# Patient Record
Sex: MALE | Race: WHITE | NOT HISPANIC OR LATINO | Employment: OTHER | URBAN - METROPOLITAN AREA
[De-identification: names, ages, dates, MRNs, and addresses within clinical notes are randomized per-mention and may not be internally consistent; named-entity substitution may affect disease eponyms.]

---

## 2024-02-07 ENCOUNTER — HOSPITAL ENCOUNTER (EMERGENCY)
Facility: HOSPITAL | Age: 54
Discharge: HOME | End: 2024-02-07
Attending: STUDENT IN AN ORGANIZED HEALTH CARE EDUCATION/TRAINING PROGRAM

## 2024-02-07 VITALS
OXYGEN SATURATION: 98 % | WEIGHT: 155 LBS | DIASTOLIC BLOOD PRESSURE: 77 MMHG | SYSTOLIC BLOOD PRESSURE: 112 MMHG | TEMPERATURE: 98 F | HEIGHT: 70 IN | BODY MASS INDEX: 22.19 KG/M2 | HEART RATE: 60 BPM | RESPIRATION RATE: 16 BRPM

## 2024-02-07 DIAGNOSIS — M54.16 LUMBAR RADICULAR PAIN: ICD-10-CM

## 2024-02-07 DIAGNOSIS — M54.31 SCIATICA OF RIGHT SIDE: Primary | ICD-10-CM

## 2024-02-07 DIAGNOSIS — M51.26 PROTRUSION OF LUMBAR INTERVERTEBRAL DISC: ICD-10-CM

## 2024-02-07 PROCEDURE — 72131 CT LUMBAR SPINE W/O DYE: CPT | Mod: 26,,, | Performed by: RADIOLOGY

## 2024-02-07 PROCEDURE — 73502 X-RAY EXAM HIP UNI 2-3 VIEWS: CPT | Mod: 26,RT,, | Performed by: RADIOLOGY

## 2024-02-07 PROCEDURE — 25000003 PHARM REV CODE 250: Performed by: NURSE PRACTITIONER

## 2024-02-07 PROCEDURE — 96372 THER/PROPH/DIAG INJ SC/IM: CPT | Performed by: NURSE PRACTITIONER

## 2024-02-07 PROCEDURE — 99285 EMERGENCY DEPT VISIT HI MDM: CPT | Mod: 25

## 2024-02-07 PROCEDURE — 73502 X-RAY EXAM HIP UNI 2-3 VIEWS: CPT | Mod: TC,RT

## 2024-02-07 PROCEDURE — 72131 CT LUMBAR SPINE W/O DYE: CPT | Mod: TC

## 2024-02-07 PROCEDURE — 63600175 PHARM REV CODE 636 W HCPCS: Performed by: NURSE PRACTITIONER

## 2024-02-07 RX ORDER — HYDROMORPHONE HYDROCHLORIDE 1 MG/ML
1 INJECTION, SOLUTION INTRAMUSCULAR; INTRAVENOUS; SUBCUTANEOUS
Status: COMPLETED | OUTPATIENT
Start: 2024-02-07 | End: 2024-02-07

## 2024-02-07 RX ORDER — TRAMADOL HYDROCHLORIDE 50 MG/1
50 TABLET ORAL
Status: COMPLETED | OUTPATIENT
Start: 2024-02-07 | End: 2024-02-07

## 2024-02-07 RX ORDER — ORPHENADRINE CITRATE 30 MG/ML
60 INJECTION INTRAMUSCULAR; INTRAVENOUS
Status: COMPLETED | OUTPATIENT
Start: 2024-02-07 | End: 2024-02-07

## 2024-02-07 RX ORDER — OXYCODONE AND ACETAMINOPHEN 5; 325 MG/1; MG/1
1 TABLET ORAL EVERY 6 HOURS PRN
Qty: 12 TABLET | Refills: 0 | Status: SHIPPED | OUTPATIENT
Start: 2024-02-07

## 2024-02-07 RX ORDER — DEXAMETHASONE SODIUM PHOSPHATE 10 MG/ML
10 INJECTION INTRAMUSCULAR; INTRAVENOUS
Status: COMPLETED | OUTPATIENT
Start: 2024-02-07 | End: 2024-02-07

## 2024-02-07 RX ORDER — KETOROLAC TROMETHAMINE 30 MG/ML
60 INJECTION, SOLUTION INTRAMUSCULAR; INTRAVENOUS
Status: COMPLETED | OUTPATIENT
Start: 2024-02-07 | End: 2024-02-07

## 2024-02-07 RX ORDER — PREDNISONE 50 MG/1
50 TABLET ORAL DAILY
Qty: 5 TABLET | Refills: 0 | Status: SHIPPED | OUTPATIENT
Start: 2024-02-07

## 2024-02-07 RX ADMIN — KETOROLAC TROMETHAMINE 60 MG: 30 INJECTION INTRAMUSCULAR; INTRAVENOUS at 06:02

## 2024-02-07 RX ADMIN — TRAMADOL HYDROCHLORIDE 50 MG: 50 TABLET, COATED ORAL at 07:02

## 2024-02-07 RX ADMIN — ORPHENADRINE CITRATE 60 MG: 60 INJECTION INTRAMUSCULAR; INTRAVENOUS at 06:02

## 2024-02-07 RX ADMIN — DEXAMETHASONE SODIUM PHOSPHATE 10 MG: 10 INJECTION INTRAMUSCULAR; INTRAVENOUS at 10:02

## 2024-02-07 RX ADMIN — HYDROMORPHONE HYDROCHLORIDE 1 MG: 1 INJECTION, SOLUTION INTRAMUSCULAR; INTRAVENOUS; SUBCUTANEOUS at 10:02

## 2024-02-07 NOTE — ED TRIAGE NOTES
Pt arrives via [facility] c/o low back pain radiating down R leg after lifting heavy wheelbarrow 2 weeks ago .

## 2024-02-08 NOTE — DISCHARGE INSTRUCTIONS
Take the medications as prescribed. Return for any worsening or new symptoms. Follow up with spine specialist for further evaluation/MRI

## 2024-02-08 NOTE — ED PROVIDER NOTES
"CHIEF COMPLAINT  Chief Complaint   Patient presents with    Back Pain     Pt arrives via [facility] c/o low back pain radiating down R leg after lifting heavy wheelbarrow 1 week ago.       HISTORY OF PRESENT ILLNESS  Raji Story is a 53 y.o. male pmh of liver problem from ETOH abuse presenting with right lower back pain, radiating to right upper/lower leg for the past 1 week. Patient states he felt pain right awary after he lifted heavy wheelbarrow 1 week ago, has not tried any medication, trying to stand up or move around with severe pain, radiating to right lower leg. Patient was ambulatory with pain. No other specific aggravating or relieving factors otherwise.      PAST MEDICAL HISTORY  History reviewed. No pertinent past medical history.    CURRENT MEDICATIONS    No current facility-administered medications for this encounter.    Current Outpatient Medications:     oxyCODONE-acetaminophen (PERCOCET) 5-325 mg per tablet, Take 1 tablet by mouth every 6 (six) hours as needed for Pain., Disp: 12 tablet, Rfl: 0    predniSONE (DELTASONE) 50 MG Tab, Take 1 tablet (50 mg total) by mouth once daily., Disp: 5 tablet, Rfl: 0    ALLERGIES    Review of patient's allergies indicates:  No Known Allergies    SURGICAL HISTORY    History reviewed. No pertinent surgical history.    SOCIAL HISTORY    Social History     Socioeconomic History    Marital status: Single   Tobacco Use    Smoking status: Some Days     Types: Cigars    Smokeless tobacco: Never   Substance and Sexual Activity    Alcohol use: Not Currently       FAMILY HISTORY    History reviewed. No pertinent family history.    REVIEW OF SYSTEMS    Review of Systems   Musculoskeletal:  Positive for back pain and falls.     All other systems reviewed and are negative    VITAL SIGNS:   /77 (BP Location: Right arm, Patient Position: Sitting)   Pulse 60   Temp 98.2 °F (36.8 °C) (Oral)   Resp 16   Ht 5' 10" (1.778 m)   Wt 70.3 kg (155 lb)   SpO2 98%   BMI " 22.24 kg/m²      Physical Exam  Constitutional:       Appearance: Normal appearance.   HENT:      Head: Normocephalic.   Cardiovascular:      Rate and Rhythm: Normal rate.   Pulmonary:      Effort: Pulmonary effort is normal. No respiratory distress.      Breath sounds: Normal breath sounds.   Abdominal:      Palpations: Abdomen is soft.   Musculoskeletal:      Thoracic back: Normal range of motion.      Lumbar back: Spasms and tenderness present. Decreased range of motion. Positive right straight leg raise test.        Back:       Right hip: Tenderness present. No lacerations or crepitus. Decreased range of motion.      Left hip: No deformity.      Right upper leg: Tenderness present.        Legs:    Skin:     General: Skin is warm.      Capillary Refill: Capillary refill takes less than 2 seconds.   Neurological:      General: No focal deficit present.      Mental Status: He is alert.      GCS: GCS eye subscore is 4. GCS verbal subscore is 5. GCS motor subscore is 6.   Psychiatric:         Attention and Perception: Attention normal.         Mood and Affect: Mood normal.         Speech: Speech normal.       Vitals and nursing note reviewed.     LABS    Labs Reviewed - No data to display      EKG    No results found for this or any previous visit.      RADIOLOGY    CT Lumbar Spine Without Contrast   Final Result      No CT evidence of acute fracture or traumatic subluxation of the lumbar spine.      Broad-based disc bulge with suspected right paracentral disc protrusion at L5-S1.  This results in narrowing of the right lateral recess and may affect the descending right S1 nerve root, noting evaluation is limited by CT technique.  Clinical correlation advised in this patient with reported right lower extremity pain.  Follow-up MRI could be performed when clinically appropriate.      Additional degenerative changes of the lumbar spine, noting multilevel left-sided facet arthropathy.  Please see above for level by  level details.      Stent noted 0.5 cm pulmonary nodule at the right lung base.  Per Fleischner Society guidelines for nodule <6mm; in a low risk patient, no follow-up recommended.  In a high risk patient/smoker, consider 12 month CT chest follow-up to exclude neoplasia. If stable at that time, no further follow-up needed.         Electronically signed by: Jan Goodman MD   Date:    02/07/2024   Time:    21:31      X-Ray Hip 2 or 3 views Right (with Pelvis when performed)   Final Result      No radiographic evidence of acute displaced fracture or dislocation.         Electronically signed by: Jan Goodman MD   Date:    02/07/2024   Time:    19:36            PROCEDURES    Procedures    Medications   ketorolac injection 60 mg (60 mg Intramuscular Given 2/7/24 1850)   orphenadrine injection 60 mg (60 mg Intramuscular Given 2/7/24 1850)   traMADoL tablet 50 mg (50 mg Oral Given 2/7/24 1955)   HYDROmorphone injection 1 mg (1 mg Intramuscular Given 2/7/24 2200)   dexAMETHasone sodium phos (PF) injection 10 mg (10 mg Intramuscular Given 2/7/24 2200)                Medical Decision Making  Raji Story is a 53 y.o. male pmh of liver problem from ETOH abuse presenting with right lower back pain, radiating to right upper/lower leg for the past 1 week. Patient states he felt pain right awary after he lifted heavy wheelbarrow 1 week ago, has not tried any medication, trying to stand up or move around with severe pain, radiating to right lower leg. Patient was ambulatory with pain. No other specific aggravating or relieving factors otherwise.    DDX: Sciatica, bursitis, osteoarthritis  Low suspicion for acute cord compression or cauda equina at this time, given presentation and symptoms, including epidural abscess or hematoma. Patient has no history of malignancy, active or distant history. Patient has no unexplained weight loss. No recent fevers, rigors, malaise, or recent infection. No history of IVDU or  skin-popping. Patient does not have any history concerning for saddle anesthesia/perianal sensory loss or complaining of decreased rectal tone. Patient does not have urinary retention or inability to control urine from overflow.   Patient with no direct trauma to back other than heavy lifting. No midline tenderness to palpation.    clinical impression: Sciatic pain,Osteoarthritis  Pt's pain improved after series of interventions.   Patient was referred to spine specialist.     Problems Addressed:  Lumbar radicular pain: acute illness or injury  Protrusion of lumbar intervertebral disc: acute illness or injury  Sciatica of right side: acute illness or injury    Amount and/or Complexity of Data Reviewed  Radiology: ordered. Decision-making details documented in ED Course.    Risk  Prescription drug management.           Discharge Medication List as of 2/7/2024  9:54 PM          Discharge Medication List as of 2/7/2024  9:54 PM        START taking these medications    Details   oxyCODONE-acetaminophen (PERCOCET) 5-325 mg per tablet Take 1 tablet by mouth every 6 (six) hours as needed for Pain., Starting Wed 2/7/2024, Normal      predniSONE (DELTASONE) 50 MG Tab Take 1 tablet (50 mg total) by mouth once daily., Starting Wed 2/7/2024, Normal                 DISPOSITION  Patient discharged to home in stable condition.        FINAL IMPRESSION    1. Sciatica of right side    2. Lumbar radicular pain    3. Protrusion of lumbar intervertebral disc         Ajit Hickman NP  02/08/24 0022     Alert and oriented to person, place and time

## 2025-03-18 NOTE — ED NOTES
Noted.     Lipid panel lab has been faxed to [facility]. F: [phone].   Patient returned from ct scan at this time